# Patient Record
Sex: FEMALE | URBAN - METROPOLITAN AREA
[De-identification: names, ages, dates, MRNs, and addresses within clinical notes are randomized per-mention and may not be internally consistent; named-entity substitution may affect disease eponyms.]

---

## 2022-07-13 ENCOUNTER — APPOINTMENT (OUTPATIENT)
Dept: URBAN - METROPOLITAN AREA CLINIC 191 | Age: 42
Setting detail: DERMATOLOGY
End: 2022-07-14

## 2022-07-13 DIAGNOSIS — K64.4 RESIDUAL HEMORRHOIDAL SKIN TAGS: ICD-10-CM

## 2022-07-13 DIAGNOSIS — L23.9 ALLERGIC CONTACT DERMATITIS, UNSPECIFIED CAUSE: ICD-10-CM

## 2022-07-13 PROBLEM — D23.71 OTHER BENIGN NEOPLASM OF SKIN OF RIGHT LOWER LIMB, INCLUDING HIP: Status: ACTIVE | Noted: 2022-07-13

## 2022-07-13 PROCEDURE — OTHER PRESCRIPTION: OTHER

## 2022-07-13 PROCEDURE — OTHER PRESCRIPTION MEDICATION MANAGEMENT: OTHER

## 2022-07-13 PROCEDURE — OTHER REASSURANCE: OTHER

## 2022-07-13 PROCEDURE — OTHER COUNSELING: OTHER

## 2022-07-13 PROCEDURE — 99204 OFFICE O/P NEW MOD 45 MIN: CPT

## 2022-07-13 RX ORDER — DESONIDE 0.5 MG/G
OINTMENT TOPICAL
Qty: 60 | Refills: 0 | Status: ERX | COMMUNITY
Start: 2022-07-13

## 2022-07-13 ASSESSMENT — LOCATION ZONE DERM: LOCATION ZONE: ANUS

## 2022-07-13 ASSESSMENT — LOCATION DETAILED DESCRIPTION DERM
LOCATION DETAILED: LEFT PERIANAL SKIN
LOCATION DETAILED: INFERIOR PERIANAL SKIN

## 2022-07-13 ASSESSMENT — LOCATION SIMPLE DESCRIPTION DERM: LOCATION SIMPLE: ANUS

## 2022-07-13 NOTE — HPI: SKIN LESION
What Type Of Note Output Would You Prefer (Optional)?: Standard Output
How Severe Is Your Skin Lesion?: severe
Is This A New Presentation, Or A Follow-Up?: Skin Lesions
Additional History: Tx Hx with Rx HC cream

## 2022-07-13 NOTE — PROCEDURE: PRESCRIPTION MEDICATION MANAGEMENT
Plan: - ongoing since Oct 2021. Has seen GI- no anal fissures. \\n- Stop all external irritants.- stop wipes, try water only cleaning. Use sensitive skin bar soap for body wash.\\nKeep area dry. \\nRx start: Apply Desonide 0.05% ointment twice daily for 2 weeks.\\nApply Sudhir’s Butt Paste as barrier.
Render In Strict Bullet Format?: No
Detail Level: Zone

## 2022-12-28 ENCOUNTER — APPOINTMENT (OUTPATIENT)
Dept: URBAN - METROPOLITAN AREA CLINIC 191 | Age: 42
Setting detail: DERMATOLOGY
End: 2022-12-29

## 2022-12-28 DIAGNOSIS — L81.1 CHLOASMA: ICD-10-CM

## 2022-12-28 DIAGNOSIS — L23.9 ALLERGIC CONTACT DERMATITIS, UNSPECIFIED CAUSE: ICD-10-CM

## 2022-12-28 DIAGNOSIS — I78.8 OTHER DISEASES OF CAPILLARIES: ICD-10-CM

## 2022-12-28 PROCEDURE — 99214 OFFICE O/P EST MOD 30 MIN: CPT

## 2022-12-28 PROCEDURE — OTHER PRESCRIPTION: OTHER

## 2022-12-28 PROCEDURE — OTHER PRESCRIPTION MEDICATION MANAGEMENT: OTHER

## 2022-12-28 PROCEDURE — OTHER RECOMMENDATIONS: OTHER

## 2022-12-28 PROCEDURE — OTHER INVENTORY: OTHER

## 2022-12-28 PROCEDURE — OTHER COUNSELING: OTHER

## 2022-12-28 RX ORDER — DESONIDE 0.5 MG/G
OINTMENT TOPICAL
Qty: 60 | Refills: 0 | Status: ERX

## 2022-12-28 ASSESSMENT — LOCATION DETAILED DESCRIPTION DERM
LOCATION DETAILED: RIGHT SUPERIOR MEDIAL FOREHEAD
LOCATION DETAILED: RIGHT UPPER CUTANEOUS LIP
LOCATION DETAILED: LEFT PERIANAL SKIN
LOCATION DETAILED: LEFT INFERIOR MEDIAL MALAR CHEEK

## 2022-12-28 ASSESSMENT — LOCATION ZONE DERM
LOCATION ZONE: FACE
LOCATION ZONE: LIP
LOCATION ZONE: ANUS

## 2022-12-28 ASSESSMENT — LOCATION SIMPLE DESCRIPTION DERM
LOCATION SIMPLE: ANUS
LOCATION SIMPLE: LEFT CHEEK
LOCATION SIMPLE: RIGHT LIP
LOCATION SIMPLE: RIGHT FOREHEAD

## 2022-12-28 NOTE — PROCEDURE: RECOMMENDATIONS
Render Risk Assessment In Note?: no
Recommendation Override: Laser treatment w/ Dr. Way or Dr. Jean
Recommendation Preamble: The following recommendations were made during the visit:
Detail Level: Zone

## 2022-12-28 NOTE — PROCEDURE: PRESCRIPTION MEDICATION MANAGEMENT
Plan: - ongoing since Oct 2021. Has seen GI- pt does report that her doctor does believe she has a fissure and wants to do a colonoscopy\\n- considering LS&A, will continue to examine. \\n- Stop all external irritants.- stop wipes, try water only cleaning. Use sensitive skin bar soap for body wash.\\nKeep area dry. \\nRx continue: Apply Desonide 0.05% ointment twice a week for maintenance\\nApply Sudhir’s Butt Paste as barrier. Plan: - ongoing since Oct 2021. Has seen GI- pt does report that her doctor does believe she has a fissure and wants to do a colonoscopy\\n- considering LS&A, will continue to examine. \\n- Stop all external irritants.- stop wipes, try water only cleaning. Use sensitive skin bar soap for body wash.\\nKeep area dry. \\nRx continue: Apply Desonide 0.05% ointment twice a week for maintenance\\nApply Usdhir’s Butt Paste as barrier.

## 2022-12-28 NOTE — PROCEDURE: PRESCRIPTION MEDICATION MANAGEMENT
Plan: Begin Melasma Emulsion Cream- apply once daily at night for no more than 3 months\\nRecommended tinted sunscreen

## 2023-11-03 ENCOUNTER — APPOINTMENT (OUTPATIENT)
Dept: URBAN - METROPOLITAN AREA CLINIC 191 | Age: 43
Setting detail: DERMATOLOGY
End: 2023-11-04

## 2023-11-03 DIAGNOSIS — T07XXXA ABRASION OR FRICTION BURN OF OTHER, MULTIPLE, AND UNSPECIFIED SITES, WITHOUT MENTION OF INFECTION: ICD-10-CM

## 2023-11-03 DIAGNOSIS — L65.0 TELOGEN EFFLUVIUM: ICD-10-CM

## 2023-11-03 PROBLEM — T14.8XXA OTHER INJURY OF UNSPECIFIED BODY REGION, INITIAL ENCOUNTER: Status: ACTIVE | Noted: 2023-11-03

## 2023-11-03 PROCEDURE — OTHER COUNSELING: OTHER

## 2023-11-03 PROCEDURE — OTHER PRESCRIPTION MEDICATION MANAGEMENT: OTHER

## 2023-11-03 PROCEDURE — 99214 OFFICE O/P EST MOD 30 MIN: CPT

## 2023-11-03 PROCEDURE — OTHER TREATMENT REGIMEN: OTHER

## 2023-11-03 PROCEDURE — OTHER MIPS QUALITY: OTHER

## 2023-11-03 PROCEDURE — OTHER ORDER TESTS: OTHER

## 2023-11-03 ASSESSMENT — LOCATION ZONE DERM: LOCATION ZONE: SCALP

## 2023-11-03 ASSESSMENT — LOCATION DETAILED DESCRIPTION DERM: LOCATION DETAILED: LEFT MEDIAL FRONTAL SCALP

## 2023-11-03 ASSESSMENT — LOCATION SIMPLE DESCRIPTION DERM: LOCATION SIMPLE: LEFT SCALP

## 2023-11-03 NOTE — PROCEDURE: PRESCRIPTION MEDICATION MANAGEMENT
Plan: hair loss >2 years, worse past year. had covid infection May. \\nif labs return normal, will plan to send oral minoxidil quarter tabs daily\\nAvoid tension , heat, and excessive brushing of the hair
Detail Level: Zone
Render In Strict Bullet Format?: No

## 2023-11-03 NOTE — PROCEDURE: ORDER TESTS
Expected Date Of Service: 11/03/2023
Performing Laboratory: [id number]
Billing Type: Third-Party Bill
Bill For Surgical Tray: no

## 2023-11-06 ENCOUNTER — RX ONLY (RX ONLY)
Age: 43
End: 2023-11-06

## 2023-11-06 RX ORDER — MINOXIDIL 2.5 MG/1
TABLET ORAL
Qty: 30 | Refills: 0 | Status: ERX | COMMUNITY
Start: 2023-11-06

## 2024-06-03 RX ORDER — DESONIDE 0.5 MG/G
OINTMENT TOPICAL
Qty: 60 | Refills: 0 | Status: ERX